# Patient Record
Sex: MALE | Race: WHITE | NOT HISPANIC OR LATINO | Employment: OTHER | RURAL
[De-identification: names, ages, dates, MRNs, and addresses within clinical notes are randomized per-mention and may not be internally consistent; named-entity substitution may affect disease eponyms.]

---

## 2018-05-24 ENCOUNTER — OFFICE VISIT (OUTPATIENT)
Dept: RETAIL CLINIC | Facility: CLINIC | Age: 43
End: 2018-05-24

## 2018-05-24 VITALS
OXYGEN SATURATION: 99 % | RESPIRATION RATE: 18 BRPM | HEIGHT: 72 IN | WEIGHT: 216.6 LBS | TEMPERATURE: 97.7 F | HEART RATE: 97 BPM | BODY MASS INDEX: 29.34 KG/M2

## 2018-05-24 DIAGNOSIS — J02.0 STREP PHARYNGITIS: Primary | ICD-10-CM

## 2018-05-24 PROCEDURE — 99203 OFFICE O/P NEW LOW 30 MIN: CPT | Performed by: NURSE PRACTITIONER

## 2018-05-24 RX ORDER — METHYLPHENIDATE HYDROCHLORIDE 10 MG/1
10 TABLET ORAL 2 TIMES DAILY
COMMUNITY
End: 2020-07-28

## 2018-05-24 RX ORDER — AMOXICILLIN 875 MG/1
875 TABLET, COATED ORAL 2 TIMES DAILY
Qty: 20 TABLET | Refills: 0 | Status: SHIPPED | OUTPATIENT
Start: 2018-05-24 | End: 2018-06-03

## 2018-05-24 NOTE — PROGRESS NOTES
"Subjective   Miguelangel Anderson is a 42 y.o. male.   Chief Complaint   Patient presents with   • Sore Throat     Miguelangel presents with sore throat and swollen/enlarged uvula for 24 hours, he is unsure if he has had a fever says he hasn't checked his temperature and has taken no medications for his symptoms. No annual Flu Vaccine      Sore Throat    This is a new problem. The current episode started yesterday. The problem has been gradually worsening. Maximum temperature: unsure. Associated symptoms include swollen glands and trouble swallowing. Pertinent negatives include no shortness of breath. He has tried NSAIDs (taken last night none this morning) for the symptoms.        The following portions of the patient's history were reviewed and updated as appropriate: allergies, current medications, past family history, past medical history, past social history, past surgical history and problem list.    Review of Systems   Constitutional: Negative for fatigue.   HENT: Positive for sore throat and trouble swallowing.    Respiratory: Negative for shortness of breath.    Cardiovascular: Negative for chest pain.   Endocrine: Negative for cold intolerance.   Skin: Negative for rash.   Hematological: Negative for adenopathy.     Pulse 97   Temp 97.7 °F (36.5 °C) (Temporal Artery )   Resp 18   Ht 182.9 cm (72\")   Wt 98.2 kg (216 lb 9.6 oz)   SpO2 99%   BMI 29.38 kg/m²     Objective     Current Outpatient Prescriptions:   •  methylphenidate (RITALIN) 10 MG tablet, Take 10 mg by mouth 2 (Two) Times a Day., Disp: , Rfl:   •  Tamsulosin HCl (FLOMAX PO), Take  by mouth., Disp: , Rfl:   •  amoxicillin (AMOXIL) 875 MG tablet, Take 1 tablet by mouth 2 (Two) Times a Day for 10 days., Disp: 20 tablet, Rfl: 0  Allergies   Allergen Reactions   • Sulfa Antibiotics Swelling       Physical Exam   Constitutional: He is oriented to person, place, and time. He appears well-developed and well-nourished.   HENT:   Head: Normocephalic and " atraumatic.   Right Ear: Tympanic membrane and external ear normal.   Left Ear: Tympanic membrane and external ear normal.   Nose: Mucosal edema present. Right sinus exhibits no maxillary sinus tenderness and no frontal sinus tenderness. Left sinus exhibits no maxillary sinus tenderness and no frontal sinus tenderness.   Mouth/Throat: Uvula is midline. Uvula swelling present. Posterior oropharyngeal erythema present. No oropharyngeal exudate. Tonsils are 1+ on the right. Tonsils are 2+ on the left. Tonsillar exudate.   Eyes: Conjunctivae and EOM are normal. Pupils are equal, round, and reactive to light.   Neck: Normal range of motion. Neck supple.   Cardiovascular: Normal rate, regular rhythm and normal heart sounds.    Pulmonary/Chest: Effort normal and breath sounds normal. No respiratory distress.   Abdominal: Soft. Bowel sounds are normal.   Musculoskeletal: Normal range of motion.   Lymphadenopathy:     He has cervical adenopathy.   Neurological: He is alert and oriented to person, place, and time.   Skin: Skin is warm and dry. No rash noted.   Psychiatric: He has a normal mood and affect. His behavior is normal. Judgment and thought content normal.   Nursing note and vitals reviewed.      Assessment/Plan   Miguelangel was seen today for sore throat.    Diagnoses and all orders for this visit:    Strep pharyngitis  -     amoxicillin (AMOXIL) 875 MG tablet; Take 1 tablet by mouth 2 (Two) Times a Day for 10 days.

## 2018-05-24 NOTE — PATIENT INSTRUCTIONS
Strep Throat  Strep throat is a bacterial infection of the throat. Your health care provider may call the infection tonsillitis or pharyngitis, depending on whether there is swelling in the tonsils or at the back of the throat. Strep throat is most common during the cold months of the year in children who are 5-15 years of age, but it can happen during any season in people of any age. This infection is spread from person to person (contagious) through coughing, sneezing, or close contact.  What are the causes?  Strep throat is caused by the bacteria called Streptococcus pyogenes.  What increases the risk?  This condition is more likely to develop in:  · People who spend time in crowded places where the infection can spread easily.  · People who have close contact with someone who has strep throat.  What are the signs or symptoms?  Symptoms of this condition include:  · Fever or chills.  · Redness, swelling, or pain in the tonsils or throat.  · Pain or difficulty when swallowing.  · White or yellow spots on the tonsils or throat.  · Swollen, tender glands in the neck or under the jaw.  · Red rash all over the body (rare).  How is this diagnosed?  This condition is diagnosed by performing a rapid strep test (you are being treated based upon your physical exam and symptoms today) or by taking a swab of your throat (throat culture test). Results from a rapid strep test are usually ready in a few minutes, but throat culture test results are available after one or two days.  How is this treated?  This condition is treated with antibiotic medicine.  Follow these instructions at home:  Medicines   · Take over-the-counter and prescription medicines only as told by your health care provider.  · Take your antibiotic as told by your health care provider. Do not stop taking the antibiotic even if you start to feel better.  · Have family members who also have a sore throat or fever tested for strep throat. They may need  antibiotics if they have the strep infection.  Eating and drinking   · Do not share food, drinking cups, or personal items that could cause the infection to spread to other people.  · If swallowing is difficult, try eating soft foods until your sore throat feels better.  · Drink enough fluid to keep your urine clear or pale yellow.  General instructions   · Gargle with a salt-water mixture 3-4 times per day or as needed. To make a salt-water mixture, completely dissolve ½-1 tsp of salt in 1 cup of warm water.  · Make sure that all household members wash their hands well.  · Get plenty of rest.  · Stay home from school or work until you have been taking antibiotics for 24 hours.  · Keep all follow-up visits as told by your health care provider. This is important.  Contact a health care provider if:  · The glands in your neck continue to get bigger.  · You develop a rash, cough, or earache.  · You cough up a thick liquid that is green, yellow-brown, or bloody.  · You have pain or discomfort that does not get better with medicine.  · Your problems seem to be getting worse rather than better.  · You have a fever.  Get help right away if:  · You have new symptoms, such as vomiting, severe headache, stiff or painful neck, chest pain, or shortness of breath.  · You have severe throat pain, drooling, or changes in your voice.  · You have swelling of the neck, or the skin on the neck becomes red and tender.  · You have signs of dehydration, such as fatigue, dry mouth, and decreased urination.  · You become increasingly sleepy, or you cannot wake up completely.  · Your joints become red or painful.  This information is not intended to replace advice given to you by your health care provider. Make sure you discuss any questions you have with your health care provider.  Document Released: 12/15/2001 Document Revised: 08/16/2017 Document Reviewed: 04/11/2016  [employer] Interactive Patient Education © 2017 [employer]

## 2018-10-22 ENCOUNTER — OFFICE VISIT (OUTPATIENT)
Dept: RETAIL CLINIC | Facility: CLINIC | Age: 43
End: 2018-10-22

## 2018-10-22 VITALS
SYSTOLIC BLOOD PRESSURE: 130 MMHG | OXYGEN SATURATION: 98 % | DIASTOLIC BLOOD PRESSURE: 84 MMHG | BODY MASS INDEX: 29.73 KG/M2 | WEIGHT: 219.2 LBS | RESPIRATION RATE: 18 BRPM | HEART RATE: 65 BPM | TEMPERATURE: 98 F

## 2018-10-22 DIAGNOSIS — J01.00 ACUTE MAXILLARY SINUSITIS, RECURRENCE NOT SPECIFIED: Primary | ICD-10-CM

## 2018-10-22 DIAGNOSIS — K12.0 APHTHOUS ULCER: ICD-10-CM

## 2018-10-22 PROCEDURE — 99213 OFFICE O/P EST LOW 20 MIN: CPT | Performed by: NURSE PRACTITIONER

## 2018-10-22 RX ORDER — DIPHENHYDRAMINE, LIDOCAINE, NYSTATIN
5 KIT ORAL 4 TIMES DAILY
Qty: 100 ML | Refills: 0 | Status: SHIPPED | OUTPATIENT
Start: 2018-10-22 | End: 2018-10-27

## 2018-10-22 RX ORDER — DOXYCYCLINE 100 MG/1
100 CAPSULE ORAL 2 TIMES DAILY
Qty: 20 CAPSULE | Refills: 0 | Status: SHIPPED | OUTPATIENT
Start: 2018-10-22 | End: 2018-11-01

## 2018-10-22 NOTE — PATIENT INSTRUCTIONS
Canker Sores  Canker sores are small, painful sores that develop inside your mouth. They may also be called aphthous ulcers. You can get canker sores on the inside of your lips or cheeks, on your tongue, or anywhere inside your mouth. You can have just one canker sore or several of them. Canker sores cannot be passed from one person to another (noncontagious). These sores are different than the sores that you may get on the outside of your lips (cold sores or fever blisters).  Canker sores usually start as painful red bumps. Then they turn into small white, yellow, or gray ulcers that have red borders. The ulcers may be quite painful. The pain may be worse when you eat or drink.  What are the causes?  The cause of this condition is not known.  What increases the risk?  This condition is more likely to develop in:  · Women.  · People in their teens or 20s.  · Women who are having their menstrual period.  · People who are under a lot of emotional stress.  · People who do not get enough iron or B vitamins.  · People who have poor oral hygiene.  · People who have an injury inside the mouth. This can happen after having dental work or from chewing something hard.    What are the signs or symptoms?  Along with the canker sore, symptoms may also include:  · Fever.  · Fatigue.  · Swollen lymph nodes in your neck.    How is this diagnosed?  This condition can be diagnosed based on your symptoms. Your health care provider will also examine your mouth. Your health care provider may also do tests if you get canker sores often or if they are very bad. Tests may include:  · Blood tests to rule out other causes of canker sores.  · Taking swabs from the sore to check for infection.  · Taking a small piece of skin from the sore (biopsy) to test it for cancer.    How is this treated?  Most canker sores clear up without treatment in about 10 days. Home care is usually the only treatment that you will need. Over-the-counter medicines  can relieve discomfort. If you have severe canker sores, your health care provider may prescribe:  · Numbing ointment to relieve pain.  · Vitamins.  · Steroid medicines. These may be given as:  ? Oral pills.  ? Mouth rinses.  ? Gels.  · Antibiotic mouth rinse.    Follow these instructions at home:  · Apply, take, or use medicines only as directed by your health care provider. These include vitamins.  · If you were prescribed an antibiotic mouth rinse, finish all of it even if you start to feel better.  · Until the sores are healed:  ? Do not drink coffee or citrus juices.  ? Do not eat spicy or salty foods.  · Use a mild, over-the-counter mouth rinse as directed by your health care provider.  · Practice good oral hygiene.  ? Floss your teeth every day.  ? Brush your teeth with a soft brush twice each day.  Contact a health care provider if:  · Your symptoms do not get better after two weeks.  · You also have a fever or swollen glands.  · You get canker sores often.  · You have a canker sore that is getting larger.  · You cannot eat or drink due to your canker sores.  This information is not intended to replace advice given to you by your health care provider. Make sure you discuss any questions you have with your health care provider.  Document Released: 04/13/2012 Document Revised: 05/25/2017 Document Reviewed: 11/18/2015  [employer] Interactive Patient Education © 2018 [employer]    Sinusitis, Adult  Sinusitis is soreness and inflammation of your sinuses. Sinuses are hollow spaces in the bones around your face. They are located:  · Around your eyes.  · In the middle of your forehead.  · Behind your nose.  · In your cheekbones.    Your sinuses and nasal passages are lined with a stringy fluid (mucus). Mucus normally drains out of your sinuses. When your nasal tissues get inflamed or swollen, the mucus can get trapped or blocked so air cannot flow through your sinuses. This lets bacteria, viruses, and funguses  grow, and that leads to infection.  Follow these instructions at home:  Medicines  · Take, use, or apply over-the-counter and prescription medicines only as told by your doctor. These may include nasal sprays.  · If you were prescribed an antibiotic medicine, take it as told by your doctor. Do not stop taking the antibiotic even if you start to feel better.  Hydrate and Humidify  · Drink enough water to keep your pee (urine) clear or pale yellow.  · Use a cool mist humidifier to keep the humidity level in your home above 50%.  · Breathe in steam for 10-15 minutes, 3-4 times a day or as told by your doctor. You can do this in the bathroom while a hot shower is running.  · Try not to spend time in cool or dry air.  Rest  · Rest as much as possible.  · Sleep with your head raised (elevated).  · Make sure to get enough sleep each night.  General instructions  · Put a warm, moist washcloth on your face 3-4 times a day or as told by your doctor. This will help with discomfort.  · Wash your hands often with soap and water. If there is no soap and water, use hand .  · Do not smoke. Avoid being around people who are smoking (secondhand smoke).  · Keep all follow-up visits as told by your doctor. This is important.  Contact a doctor if:  · You have a fever.  · Your symptoms get worse.  · Your symptoms do not get better within 10 days.  Get help right away if:  · You have a very bad headache.  · You cannot stop throwing up (vomiting).  · You have pain or swelling around your face or eyes.  · You have trouble seeing.  · You feel confused.  · Your neck is stiff.  · You have trouble breathing.  This information is not intended to replace advice given to you by your health care provider. Make sure you discuss any questions you have with your health care provider.  Document Released: 06/05/2009 Document Revised: 08/13/2017 Document Reviewed: 10/12/2016  [employer] Interactive Patient Education © 2018 [employer]

## 2018-10-22 NOTE — PROGRESS NOTES
Subjective   Miguelangel Anderson is a 43 y.o. male.   Chief Complaint   Patient presents with   • URI      URI    This is a new problem. The current episode started in the past 7 days. The problem has been gradually worsening. There has been no fever. Associated symptoms include congestion, headaches, rhinorrhea and sinus pain. Associated symptoms comments: Mouth sores. He has tried antihistamine for the symptoms. The treatment provided no relief.        The following portions of the patient's history were reviewed and updated as appropriate: allergies, current medications, past family history, past medical history, past social history, past surgical history and problem list.    Review of Systems   HENT: Positive for congestion, mouth sores, postnasal drip, rhinorrhea, sinus pain and sinus pressure.    Neurological: Positive for headaches.   All other systems reviewed and are negative.      Objective   Allergies   Allergen Reactions   • Sulfa Antibiotics Swelling       Physical Exam   Constitutional: He is oriented to person, place, and time. He appears well-developed and well-nourished.   HENT:   Right Ear: Tympanic membrane normal.   Left Ear: Tympanic membrane normal.   Nose: Mucosal edema present. Right sinus exhibits maxillary sinus tenderness. Left sinus exhibits maxillary sinus tenderness.   Mouth/Throat: Posterior oropharyngeal erythema present. No oropharyngeal exudate.   Copious mucoid PND   Eyes: Pupils are equal, round, and reactive to light.   Neck: Neck supple.   Cardiovascular: Normal rate and regular rhythm.    Pulmonary/Chest: Effort normal and breath sounds normal.   Abdominal: There is no rebound.   Lymphadenopathy:     He has no cervical adenopathy.   Neurological: He is alert and oriented to person, place, and time.   Skin: Skin is warm and dry. No rash noted.   Psychiatric: He has a normal mood and affect.   Vitals reviewed.      Assessment/Plan   Miguelangel was seen today for uri.    Diagnoses and all  orders for this visit:    Acute maxillary sinusitis, recurrence not specified    Aphthous ulcer    Other orders  -     doxycycline (MONODOX) 100 MG capsule; Take 1 capsule by mouth 2 (Two) Times a Day for 10 days.  -     nystatin susp + lidocaine viscous (MAGIC MOUTHWASH) oral suspension; Swish and swallow 5 mL 4 (Four) Times a Day for 5 days. 1:1                 This document has been electronically signed by BLAINE Duncan October 22, 2018 12:51 PM

## 2019-01-29 ENCOUNTER — TRANSCRIBE ORDERS (OUTPATIENT)
Dept: ADMINISTRATIVE | Facility: HOSPITAL | Age: 44
End: 2019-01-29

## 2020-07-28 ENCOUNTER — OFFICE VISIT (OUTPATIENT)
Dept: SURGERY | Facility: CLINIC | Age: 45
End: 2020-07-28

## 2020-07-28 VITALS
BODY MASS INDEX: 31.02 KG/M2 | SYSTOLIC BLOOD PRESSURE: 123 MMHG | HEART RATE: 59 BPM | WEIGHT: 229 LBS | DIASTOLIC BLOOD PRESSURE: 86 MMHG | HEIGHT: 72 IN

## 2020-07-28 DIAGNOSIS — K64.5 THROMBOSED EXTERNAL HEMORRHOID: Primary | ICD-10-CM

## 2020-07-28 PROCEDURE — 46083 INC THROMBOSED HROID XTRNL: CPT | Performed by: SURGERY

## 2020-07-28 NOTE — PROGRESS NOTES
Subjective   Miguelangel Anderson is a 45 y.o. male is being seen for consultation today at the request of Mannie Horvath DO    Miguelangel Anderson is a 45 y.o. male Presenting with perianal pain and swelling.  On examination he has a thrombosed external hemorrhoid in the left lateral column.  No diarrhea or blood per rectum.  Pain began 2 to 3 days ago and has worsened over time.  No discharge noted.  No family history of colon cancer.      Past Medical History:   Diagnosis Date   • ADD (attention deficit disorder)    • Environmental allergies    • Strep pharyngitis        Family History   Problem Relation Age of Onset   • Heart disease Father    • Diabetes Father    • Stroke Father    • Obesity Mother    • Obesity Sister    • Cancer Brother    • Diabetes Brother    • Heart disease Brother    • Obesity Brother    • Cancer Maternal Grandmother        Social History     Socioeconomic History   • Marital status:      Spouse name: Not on file   • Number of children: Not on file   • Years of education: Not on file   • Highest education level: Not on file   Occupational History   • Occupation: Business Owner   Tobacco Use   • Smoking status: Never Smoker   • Smokeless tobacco: Never Used   Substance and Sexual Activity   • Alcohol use: No   • Drug use: No       Past Surgical History:   Procedure Laterality Date   • EAR TUBES Bilateral     at 9 months, 2/4/8 years had 4 sets   • FRACTURE SURGERY Left 1993    upper arm with plate & screws   • KNEE ARTHROSCOPY Right 1990   • NASAL SEPTUM SURGERY  2003   • SHOULDER SURGERY Left 1987    fracture with setting       Review of Systems   Constitutional: Negative for activity change, appetite change, chills and fever.   HENT: Negative for sore throat and trouble swallowing.    Eyes: Negative for visual disturbance.   Respiratory: Negative for cough and shortness of breath.    Cardiovascular: Negative for chest pain and palpitations.   Gastrointestinal: Negative for abdominal  "distention, abdominal pain, blood in stool, constipation, diarrhea, nausea and vomiting.   Endocrine: Negative for cold intolerance and heat intolerance.   Genitourinary: Negative for dysuria.   Musculoskeletal: Negative for joint swelling.   Skin: Negative for color change, rash and wound.   Allergic/Immunologic: Negative for immunocompromised state.   Neurological: Negative for dizziness, seizures, weakness and headaches.   Hematological: Negative for adenopathy. Does not bruise/bleed easily.   Psychiatric/Behavioral: Negative for agitation and confusion.         /86   Pulse 59   Ht 182.9 cm (72.01\")   Wt 104 kg (229 lb)   BMI 31.05 kg/m²   Objective   Physical Exam   Constitutional: He is oriented to person, place, and time. He appears well-developed.   HENT:   Head: Normocephalic and atraumatic.   Mouth/Throat: Mucous membranes are normal.   Eyes: Pupils are equal, round, and reactive to light. Conjunctivae are normal.   Neck: Neck supple. No JVD present. No tracheal deviation present. No thyromegaly present.   Cardiovascular: Normal rate and regular rhythm. Exam reveals no gallop and no friction rub.   No murmur heard.  Pulmonary/Chest: Effort normal and breath sounds normal.   Abdominal: Soft. He exhibits no distension. There is no splenomegaly or hepatomegaly. There is no tenderness. No hernia.   Genitourinary: Rectal exam shows external hemorrhoid.         Genitourinary Comments: Left lateral thrombosed external hemorrhoid   Musculoskeletal: Normal range of motion. He exhibits no deformity.   Neurological: He is alert and oriented to person, place, and time.   Skin: Skin is warm and dry.   Psychiatric: He has a normal mood and affect.       Evacuation of thrombosed external hemorrhoid    Patient perianal tissues prepped and draped in usual sterile fashion.  After injection of local anesthetic a radially oriented incision overlying the hemorrhoid was made and dissection was carried down to the " thrombus.  Thrombus was removed with direct pressure and there is no evidence of active bleeding.  The wound was left open and dressed with an ABD pad.  Patient tolerated the procedure well.      Assessment   Miguelangel was seen today for hemorrhoids.    Diagnoses and all orders for this visit:    Thrombosed external hemorrhoid      Miguelangel Anderson is a 45 y.o. male with thrombosed external hemorrhoid in the left lateral location.  Evacuation of clot performed in the office today and he will follow-up in 2 weeks.    Patient's Body mass index is 31.05 kg/m². BMI is above normal parameters. Recommendations include: educational material.

## 2021-03-10 ENCOUNTER — HOSPITAL ENCOUNTER (OUTPATIENT)
Dept: GENERAL RADIOLOGY | Facility: HOSPITAL | Age: 46
Discharge: HOME | End: 2021-03-10
Admitting: FAMILY MEDICINE

## 2021-03-10 ENCOUNTER — TRANSCRIBE ORDERS (OUTPATIENT)
Dept: ADMINISTRATIVE | Facility: HOSPITAL | Age: 46
End: 2021-03-10

## 2021-03-10 DIAGNOSIS — M25.512 LEFT SHOULDER PAIN, UNSPECIFIED CHRONICITY: ICD-10-CM

## 2021-03-10 DIAGNOSIS — M25.512 LEFT SHOULDER PAIN, UNSPECIFIED CHRONICITY: Primary | ICD-10-CM

## 2021-03-10 PROCEDURE — 73030 X-RAY EXAM OF SHOULDER: CPT

## 2021-03-10 PROCEDURE — 73030 X-RAY EXAM OF SHOULDER: CPT | Performed by: RADIOLOGY

## 2021-08-24 ENCOUNTER — HOSPITAL ENCOUNTER (OUTPATIENT)
Dept: HOSPITAL 79 - RAD | Age: 46
End: 2021-08-24
Attending: FAMILY MEDICINE
Payer: COMMERCIAL

## 2021-08-24 DIAGNOSIS — L55.9: ICD-10-CM

## 2021-08-24 DIAGNOSIS — S22.000A: ICD-10-CM

## 2021-08-24 DIAGNOSIS — R07.81: ICD-10-CM

## 2021-08-24 DIAGNOSIS — S22.32XA: ICD-10-CM

## 2021-08-24 DIAGNOSIS — M54.6: ICD-10-CM

## 2021-08-24 DIAGNOSIS — S32.000A: ICD-10-CM

## 2021-08-24 DIAGNOSIS — M54.5: ICD-10-CM

## 2021-08-24 DIAGNOSIS — Z00.00: Primary | ICD-10-CM
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
## 2021-10-29 ENCOUNTER — OFFICE VISIT (OUTPATIENT)
Dept: UROLOGY | Facility: CLINIC | Age: 46
End: 2021-10-29

## 2021-10-29 VITALS — BODY MASS INDEX: 32.1 KG/M2 | HEIGHT: 72 IN | WEIGHT: 237 LBS

## 2021-10-29 DIAGNOSIS — N50.812 LEFT TESTICULAR PAIN: Primary | ICD-10-CM

## 2021-10-29 DIAGNOSIS — I86.1 LEFT VARICOCELE: ICD-10-CM

## 2021-10-29 DIAGNOSIS — R35.0 FREQUENCY OF MICTURITION: ICD-10-CM

## 2021-10-29 DIAGNOSIS — R10.32 GROIN PAIN, LEFT: ICD-10-CM

## 2021-10-29 LAB
BILIRUB BLD-MCNC: NEGATIVE MG/DL
CLARITY, POC: CLEAR
COLOR UR: YELLOW
EXPIRATION DATE: ABNORMAL
GLUCOSE UR STRIP-MCNC: NEGATIVE MG/DL
KETONES UR QL: ABNORMAL
LEUKOCYTE EST, POC: NEGATIVE
Lab: ABNORMAL
NITRITE UR-MCNC: NEGATIVE MG/ML
PH UR: 5 [PH] (ref 5–8)
PROT UR STRIP-MCNC: ABNORMAL MG/DL
RBC # UR STRIP: NEGATIVE /UL
SP GR UR: 1.03 (ref 1–1.03)
UROBILINOGEN UR QL: NORMAL

## 2021-10-29 PROCEDURE — 81003 URINALYSIS AUTO W/O SCOPE: CPT | Performed by: NURSE PRACTITIONER

## 2021-10-29 PROCEDURE — 99204 OFFICE O/P NEW MOD 45 MIN: CPT | Performed by: NURSE PRACTITIONER

## 2021-10-29 RX ORDER — IBUPROFEN 800 MG/1
800 TABLET ORAL EVERY 8 HOURS PRN
Qty: 30 TABLET | Refills: 1 | Status: SHIPPED | OUTPATIENT
Start: 2021-10-29 | End: 2022-08-08 | Stop reason: SDUPTHER

## 2021-10-29 RX ORDER — ERGOCALCIFEROL 1.25 MG/1
50000 CAPSULE ORAL WEEKLY
COMMUNITY
Start: 2021-10-26

## 2021-10-29 RX ORDER — ONDANSETRON 8 MG/1
TABLET, ORALLY DISINTEGRATING ORAL 3 TIMES DAILY
COMMUNITY
Start: 2021-08-04 | End: 2021-10-29

## 2021-10-29 RX ORDER — ONDANSETRON 4 MG/1
4 TABLET, FILM COATED ORAL EVERY 12 HOURS PRN
Qty: 20 TABLET | Refills: 0 | Status: SHIPPED | OUTPATIENT
Start: 2021-10-29

## 2021-10-29 RX ORDER — DEXAMETHASONE 6 MG/1
TABLET ORAL
COMMUNITY
Start: 2021-09-26 | End: 2021-10-29

## 2021-10-29 RX ORDER — IBUPROFEN 600 MG/1
TABLET ORAL AS NEEDED
COMMUNITY
Start: 2021-08-20 | End: 2021-10-29

## 2021-10-29 RX ORDER — LEVALBUTEROL INHALATION SOLUTION 1.25 MG/3ML
SOLUTION RESPIRATORY (INHALATION) TAKE AS DIRECTED
COMMUNITY
Start: 2021-10-04 | End: 2021-10-29

## 2021-10-29 RX ORDER — LOSARTAN POTASSIUM 25 MG/1
50 TABLET ORAL DAILY
COMMUNITY
Start: 2021-10-26 | End: 2022-03-31 | Stop reason: SDUPTHER

## 2021-10-29 RX ORDER — ROSUVASTATIN CALCIUM 10 MG/1
1 TABLET, COATED ORAL DAILY
COMMUNITY
Start: 2021-09-30 | End: 2021-10-29

## 2021-10-29 RX ORDER — ALBUTEROL SULFATE 90 UG/1
AEROSOL, METERED RESPIRATORY (INHALATION)
COMMUNITY
Start: 2021-09-26 | End: 2021-10-29

## 2021-10-29 RX ORDER — HYDROCODONE BITARTRATE AND ACETAMINOPHEN 7.5; 325 MG/1; MG/1
1 TABLET ORAL 3 TIMES DAILY PRN
COMMUNITY
Start: 2021-08-24 | End: 2021-10-29

## 2021-10-29 RX ORDER — BUDESONIDE 0.5 MG/2ML
INHALANT ORAL
COMMUNITY
Start: 2021-09-26 | End: 2021-10-29

## 2021-10-29 RX ORDER — LEVOCETIRIZINE DIHYDROCHLORIDE 5 MG/1
5 TABLET, FILM COATED ORAL
COMMUNITY
Start: 2021-10-26

## 2021-11-08 PROBLEM — N50.812 LEFT TESTICULAR PAIN: Status: ACTIVE | Noted: 2021-11-08

## 2021-11-08 PROBLEM — R10.32 GROIN PAIN, LEFT: Status: ACTIVE | Noted: 2021-11-08

## 2021-11-10 ENCOUNTER — HOSPITAL ENCOUNTER (OUTPATIENT)
Dept: ULTRASOUND IMAGING | Facility: HOSPITAL | Age: 46
Discharge: HOME | End: 2021-11-10
Admitting: NURSE PRACTITIONER

## 2021-11-10 DIAGNOSIS — R35.0 FREQUENCY OF MICTURITION: ICD-10-CM

## 2021-11-10 DIAGNOSIS — N50.812 LEFT TESTICULAR PAIN: ICD-10-CM

## 2021-11-10 DIAGNOSIS — R10.32 GROIN PAIN, LEFT: ICD-10-CM

## 2021-11-10 DIAGNOSIS — I86.1 LEFT VARICOCELE: ICD-10-CM

## 2021-11-10 PROCEDURE — 76870 US EXAM SCROTUM: CPT | Performed by: RADIOLOGY

## 2021-11-10 PROCEDURE — 76870 US EXAM SCROTUM: CPT

## 2021-11-12 ENCOUNTER — OFFICE VISIT (OUTPATIENT)
Dept: UROLOGY | Facility: CLINIC | Age: 46
End: 2021-11-12

## 2021-11-12 VITALS — WEIGHT: 237 LBS | HEIGHT: 72 IN | BODY MASS INDEX: 32.1 KG/M2

## 2021-11-12 DIAGNOSIS — N43.3 HYDROCELE, RIGHT: ICD-10-CM

## 2021-11-12 DIAGNOSIS — N50.812 LEFT TESTICULAR PAIN: ICD-10-CM

## 2021-11-12 DIAGNOSIS — R10.32 GROIN PAIN, LEFT: ICD-10-CM

## 2021-11-12 DIAGNOSIS — I86.1 BILATERAL VARICOCELES: Primary | ICD-10-CM

## 2021-11-12 PROCEDURE — 99214 OFFICE O/P EST MOD 30 MIN: CPT | Performed by: NURSE PRACTITIONER

## 2021-11-12 NOTE — PROGRESS NOTES
"Chief Complaint  LEFT Testicle Pain/VARICOCELE/HYDROCELE (2 WEEK FOLLOW UP/REVIEW ULTRASOUND)    Subjective          Syed Anderson presents to [facility]  History of Present Illness    MR. SYED ANDERSON is a Very pleasant 46-year-old male patient, with a history of BPH with minor urinary symptoms(URNE FREQUENCY), who  returns to clinic today for evaluation.  This is his 2-week follow-up for left groin pain/left testicle pain, He is here to review his ultrasound results which are unremarkable. His last PSA was 0.4 on 09/21/2021, he denies any family history of prostate disorder.    We both reviewed/discussed his scrotal ultrasound results. The imaging of his testicles show a homogeneous echotexture of both right and left testicle, with no testicular mass noted. However he has small right-sided hydrocele, and an equivocal mild varicoceles bilaterally.     He is in no apparent distress today and reports doing relatively better, with significant improvement in his groin pain. Reports warm baths and showers has been very helpful, and rates his discomfort today at a 1/10. Patient denies any known trauma to groin area.  Denies lifting any heavy objects, he denies any issues with constipation or straining on bowel movements. HE has never had prostatitis, and denies any feeling of sitting on eggshells.  He denies any episodes of gross hematuria.     Besides hesitancy and nocturia at times, he denies having any urinary symptoms of frequency, urgency, dysuria or burning on urination.  He denies gross hematuria, chills or fevers, he denies back pain, abdominal pain, flank pain, he does not have any CVA tenderness.  Denies any penile discharge, or any episodes of gross/microscopic hematuria. His IPSS score is 6     Objective   Vital Signs:   Ht 182.9 cm (72.01\")   Wt 108 kg (237 lb)   BMI 32.14 kg/m²     Physical Exam  Constitutional:       General: He is not in acute distress.     " Appearance: He is well-developed. He is obese.   HENT:      Head: Normocephalic and atraumatic.      Right Ear: External ear normal.      Left Ear: External ear normal.   Eyes:      General:         Right eye: No discharge.         Left eye: No discharge.      Conjunctiva/sclera: Conjunctivae normal.      Pupils: Pupils are equal, round, and reactive to light.   Neck:      Thyroid: No thyromegaly.      Trachea: No tracheal deviation.   Cardiovascular:      Rate and Rhythm: Normal rate and regular rhythm.      Heart sounds: No murmur heard.  No friction rub.   Pulmonary:      Effort: Pulmonary effort is normal. No respiratory distress.      Breath sounds: Normal breath sounds. No stridor.   Abdominal:      General: Bowel sounds are normal. There is distension.      Palpations: Abdomen is soft.      Tenderness: There is abdominal tenderness. There is no guarding.   Genitourinary:     Penis: Normal and uncircumcised. No tenderness or discharge.       Testes: Normal.      Rectum: Normal. Guaiac result negative.   Musculoskeletal:         General: Tenderness present. No deformity. Normal range of motion.      Cervical back: Normal range of motion and neck supple.   Skin:     General: Skin is warm and dry.      Capillary Refill: Capillary refill takes less than 2 seconds.      Coloration: Skin is not pale.   Neurological:      Mental Status: He is alert and oriented to person, place, and time.      Cranial Nerves: No cranial nerve deficit.      Coordination: Coordination normal.   Psychiatric:         Behavior: Behavior normal.         Thought Content: Thought content normal.         Judgment: Judgment normal.        IPSS Questionnaire (AUA-7):  Over the past month…    1)  How often have you had a sensation of not emptying your bladder completely after you finish urinating?  1 - Less than 1 time in 5   2)  How often have you had to urinate again less than two hours after you finished urinating? 1 - Less than 1 time in 5    3)  How often have you found you stopped and started again several times when you urinated?  0 - Not at all   4) How difficult have you found it to postpone urination?  0 - Not at all   5) How often have you had a weak urinary stream?  0 - Not at all   6) How often have you had to push or strain to begin urination?  1 - Less than 1 time in 5   7) How many times did you most typically get up to urinate from the time you went to bed until the time you got up in the morning?  1 - 1 time   Total score:  0-7 mildly symptomatic                                                         4    8-19 moderately symptomatic    20-35 severely symptomatic       Result Review :     UA    Urinalysis 10/29/21   Ketones, UA Trace (A)   Leukocytes, UA Negative   (A) Abnormal value                              Assessment and Plan    Diagnoses and all orders for this visit:    1. Bilateral varicoceles (Primary)    2. Hydrocele, right    3. Groin pain, left    4. Left testicular pain      BPH: WE Discussed the pathophysiology of BPH and obstruction. We discussed the static and dynamic effects of BPH as well as using 5 alpha reductase inhibitors versus alpha blockade.  We discussed the indications for transurethral surgery as well.  And/ or other therapeutic options available including all of the newer techniques.  He has no real symptomatology referable to his prostate other than moderate enlargement.  Rather than proceed with an expensive workup he doesn't need, at this time I am recommending an over-the-counter meds such as saw palmetto if indicated. Patient denies any voiding dysfunction.    Next, WE discussed the pathophysiology of varicocele length.  We discussed the staging system of a grade 1 varicocele which is representative of palpable veins of the pampiniform plexus with Valsalva versus a grade 2 and finally grade 3 with testicular atrophy encompassing the entire left hemiscrotum.      I discussed the fact that these are  usually on the left side we discussed the very rare association with malignancy with an acute process.  We discussed the treatment options and less severe cases.  I discussed the fact that 15% of men have these varicoceles and then 30% of men have infertility associated with it but at this time is nothing would pursue until fertility becomes an issue.  We talked about scrotal elevation. We talked about the surgical intervention which is a ligation of a varicocele versus the radiographic embolization with coils when it becomes severe or pain becomes problematic this is a grade 3 left varicocele with significant atrophy.    Finally, we discussed his right hydrocele-we discussed the presence of a hydrocele and the pathophysiology.  We discussed the in indications for intervention including discomfort pain interference with sexual intercourse.  We discussed various treatment alternatives including aspiration with 50% chance of recurrence and open repair with a 6% chance of recurrence but the need for invasive surgery after      We discussed treatment options for his groin pain  with patient encouraged to continue conservative therapy. This is to include scrotal support, rest is the most important treatment in the case of flank pain. Rest and physical therapy are enough to improve minor pain.  Discussed to monitor HIS daily routine with prevention of groin pain by: At least Drinking 8 glasses of water per day, Limiting your alcohol consumption.  Having a healthy diet containing fruits, vegetables, and a lot of fluids, Practicing safe sex. Avoiding heavy lifting, also maintaining proper hygiene of your body as well as the environment. Avoiding any trauma to groin area.  .  Follow-up in 1 year for BPH with PSA prior, sooner if need be.    Patient is in apparent discomfort at this time, denies any surgical interventions    Follow Up   Return in 52 weeks (on 11/11/2022) for Next scheduled follow up BPH/ VARICOCELE/HYDROCEL  LEFT GROIN PAIN.  Patient was given instructions and counseling regarding his condition or for health maintenance advice. Please see specific information pulled into the AVS if appropriate.

## 2021-11-12 NOTE — PATIENT INSTRUCTIONS
Varicocele    A varicocele is a swelling of veins in the scrotum. The scrotum is the sac that contains the testicles. Varicoceles can occur on either side of the scrotum, but they are more common on the left side. They occur most often in teenage boys and young men.  In most cases, varicoceles are not a serious problem. They are usually small and painless and do not require treatment. Tests may be done to confirm the diagnosis. Treatment may be needed if:  A varicocele is large, causes a lot of pain, or causes pain when exercising.  Varicoceles are found on both sides of the scrotum.  A varicocele causes a decrease in the size of the testicle in a growing adolescent.  The person has fertility problems.  What are the causes?  This condition is the result of valves in the veins not working properly. Valves in the veins help to return blood from the scrotum and testicles to the heart. If these valves do not work well, blood flows backward and backs up into the veins, which causes the veins to swell. This is similar to what happens when varicose veins form in the leg.  What are the signs or symptoms?  Most varicoceles do not cause any symptoms. If symptoms do occur, they may include:  Swelling on one side of the scrotum. The swelling may be more obvious when you are standing up.  A lumpy feeling in the scrotum.  A heavy feeling on one side of the scrotum.  A dull ache in the scrotum, especially after exercise or prolonged standing or sitting.  Slower growth or reduced size of the testicle on the side of the varicocele (in young males).  Problems with fathering a child (fertility). This can occur if the testicle does not grow normally or if the condition causes problems with the sperm, such as a low sperm count or sperm that are not able to reach the egg (poor motility).  How is this diagnosed?  This condition is diagnosed based on:  Your medical history.  A physical exam. Your health care provider may inspect and feel  (palpate) the scrotal area to check for swollen or enlarged veins.  An ultrasound. This may be done to confirm the diagnosis and to help rule out other causes of the swelling.  How is this treated?  Treatment is usually not needed for this condition. If you have any pain, your health care provider may prescribe or recommend medicine to help relieve it. You may need regular exams so your health care provider can monitor the varicocele to ensure that it does not cause problems.  When further treatment is needed, it may involve one of these options:  Varicocelectomy. This is a surgery in which the swollen veins are tied off so that the flow of blood goes to other veins instead.  Embolization. In this procedure, a small, thin tube (catheter) is used to place metal coils or other blocking items in the veins. This cuts off the blood flow to the swollen veins.  Follow these instructions at home:  Take over-the-counter and prescription medicines only as told by your health care provider.  Wear supportive underwear.  Use an athletic supporter when participating in sports activities.  Keep all follow-up visits as told by your health care provider. This is important.  Contact a health care provider if:  Your pain is increasing.  You have redness in the affected area.  Your testicle becomes enlarged, swollen, or painful.  You have swelling that does not decrease when you are lying down.  One of your testicles is smaller than the other.  Get help right away if:  You develop swelling in your legs.  You have difficulty breathing.  Summary  Varicocele is a condition in which the veins in the scrotum are swollen or enlarged.  In most cases, varicoceles do not require treatment.  Treatment may be needed if you have pain, have problems with infertility, or have a smaller testicle associated with the varicocele.  In some cases, the condition may be treated with a procedure to cut off the flow of blood to the swollen veins.  This  information is not intended to replace advice given to you by your health care provider. Make sure you discuss any questions you have with your health care provider.  Document Revised: 03/06/2020 Document Reviewed: 03/06/2020  [employer] Patient Education © 2021 [employer]  Scrotal Swelling  Scrotal swelling refers to a condition in which the sac of skin that contains the testes (scrotum) is enlarged or swollen. Many things can cause the scrotum to enlarge or swell, including:  Fluid around the testicle (hydrocele).  A weakened area in the muscles around the groin (hernia).  An enlarged vein around the testicle (varicocele).  An injury.  An infection.  Certain medical treatments.  Certain medical conditions, such as congestive heart failure.  A recent genital surgery or procedure.  A twisting of the spermatic cord that cuts off blood supply (testicular torsion).  Testicular cancer.  Scrotal swelling can happen along with scrotal pain.  Follow these instructions at home:  Until the swelling goes away:  Rest. The best position to rest in is to lie down.  Limit activity.  Put ice on the scrotum:  Put ice in a plastic bag.  Place a towel between your skin and the bag.  Leave the ice on for 20 minutes, 2-3 times a day for 1-2 days.  Place a rolled towel under your testicles for support.  Wear loose-fitting clothing or an athletic support cup for comfort.  Take over-the-counter and prescription medicines only as told by your health care provider.  Perform a monthly self-exam of the scrotum and penis. Feel for changes. Ask your health care provider how to perform a monthly self-exam if you are unsure.  Contact a health care provider if:  You have a sudden pain that is persistent and does not improve.  You have a heavy feeling or notice fluid in the scrotum.  You have pain or burning while urinating.  You have blood in your urine or semen.  You feel a lump around the testicle.  You notice that one testicle is larger than  the other. Keep in mind that a small difference in size is normal.  You have a persistent dull ache or pain in your groin or scrotum.  Get help right away if:  The pain does not go away.  The pain becomes severe.  You have a fever or chills.  You have pain or vomiting that cannot be controlled.  One or both sides of the scrotum are very red and swollen.  There is redness spreading upward from your scrotum to your abdomen or downward from your scrotum to your thighs.  Summary  Scrotal swelling refers to a condition in which the sac of skin that contains the testes (scrotum) is enlarged.  Many things can cause the scrotum to swell, including hydrocele, a hernia, and a varicocele.  Limiting activity and icing the scrotum may help reduce swelling and pain.  Contact your health care provider if you develop scrotal pain that is sudden and persistent, or if you have pain while urinating. Do this also if you feel a lump around the testicle or notice blood in your urine or semen.  Get help right away for uncontrolled pain or vomiting, for very red and swollen scrotum, or for fever or chills.  This information is not intended to replace advice given to you by your health care provider. Make sure you discuss any questions you have with your health care provider.  Document Revised: 11/30/2018 Document Reviewed: 03/05/2018  [employer] Patient Education © 2021 [employer]  Varicocele    A varicocele is a swelling of veins in the scrotum. The scrotum is the sac that contains the testicles. Varicoceles can occur on either side of the scrotum, but they are more common on the left side. They occur most often in teenage boys and young men.  In most cases, varicoceles are not a serious problem. They are usually small and painless and do not require treatment. Tests may be done to confirm the diagnosis. Treatment may be needed if:  · A varicocele is large, causes a lot of pain, or causes pain when exercising.  · Varicoceles are found on  both sides of the scrotum.  · A varicocele causes a decrease in the size of the testicle in a growing adolescent.  · The person has fertility problems.  What are the causes?  This condition is the result of valves in the veins not working properly. Valves in the veins help to return blood from the scrotum and testicles to the heart. If these valves do not work well, blood flows backward and backs up into the veins, which causes the veins to swell. This is similar to what happens when varicose veins form in the leg.  What are the signs or symptoms?  Most varicoceles do not cause any symptoms. If symptoms do occur, they may include:  · Swelling on one side of the scrotum. The swelling may be more obvious when you are standing up.  · A lumpy feeling in the scrotum.  · A heavy feeling on one side of the scrotum.  · A dull ache in the scrotum, especially after exercise or prolonged standing or sitting.  · Slower growth or reduced size of the testicle on the side of the varicocele (in young males).  · Problems with fathering a child (fertility). This can occur if the testicle does not grow normally or if the condition causes problems with the sperm, such as a low sperm count or sperm that are not able to reach the egg (poor motility).  How is this diagnosed?  This condition is diagnosed based on:  · Your medical history.  · A physical exam. Your health care provider may inspect and feel (palpate) the scrotal area to check for swollen or enlarged veins.  · An ultrasound. This may be done to confirm the diagnosis and to help rule out other causes of the swelling.  How is this treated?  Treatment is usually not needed for this condition. If you have any pain, your health care provider may prescribe or recommend medicine to help relieve it. You may need regular exams so your health care provider can monitor the varicocele to ensure that it does not cause problems.  When further treatment is needed, it may involve one of these  options:  · Varicocelectomy. This is a surgery in which the swollen veins are tied off so that the flow of blood goes to other veins instead.  · Embolization. In this procedure, a small, thin tube (catheter) is used to place metal coils or other blocking items in the veins. This cuts off the blood flow to the swollen veins.  Follow these instructions at home:  · Take over-the-counter and prescription medicines only as told by your health care provider.  · Wear supportive underwear.  · Use an athletic supporter when participating in sports activities.  · Keep all follow-up visits as told by your health care provider. This is important.  Contact a health care provider if:  · Your pain is increasing.  · You have redness in the affected area.  · Your testicle becomes enlarged, swollen, or painful.  · You have swelling that does not decrease when you are lying down.  · One of your testicles is smaller than the other.  Get help right away if:  · You develop swelling in your legs.  · You have difficulty breathing.  Summary  · Varicocele is a condition in which the veins in the scrotum are swollen or enlarged.  · In most cases, varicoceles do not require treatment.  · Treatment may be needed if you have pain, have problems with infertility, or have a smaller testicle associated with the varicocele.  · In some cases, the condition may be treated with a procedure to cut off the flow of blood to the swollen veins.  This information is not intended to replace advice given to you by your health care provider. Make sure you discuss any questions you have with your health care provider.  Document Revised: 03/06/2020 Document Reviewed: 03/06/2020  Elsevier Patient Education © 2021 [employer]    Hydrocele, Adult  A hydrocele is a collection of fluid in the loose pouch of skin that holds the testicles (scrotum). This may happen because:  The amount of fluid produced in the scrotum is not absorbed by the rest of the body.  Fluid  from the abdomen fills the scrotum. Normally, the testicles develop in the abdomen then drop into to the scrotum before birth. The tube that the testicles travel through usually closes after the testicles drop. If the tube does not close, fluid from the abdomen can fill the scrotum. This is not very common in adults.  What are the causes?  A hydrocele may be caused by:  An injury to the scrotum.  An infection.  Decreased blood flow to the scrotum.  Twisting of a testicle (testicular torsion).  A birth defect.  A tumor or cancer of the testicle.  Sometimes, the cause is not known.  What are the signs or symptoms?  A hydrocele feels like a water-filled balloon. It may also feel heavy. Other symptoms include:  Swelling of the scrotum. The swelling may decrease when you lie down. You may also notice more swelling at night than in the morning. This is called a communicating hydrocele, in which the fluid in the scrotum goes back into the abdominal cavity when the position of the scrotum changes.  Swelling of the groin.  Mild discomfort in the scrotum.  Pain. This can develop if the hydrocele was caused by infection or twisting. The larger the hydrocele, the more likely you are to have pain. Swelling may also cause pain.  How is this diagnosed?  This condition may be diagnosed based on a physical exam and your medical history. You may also have tests, including:  Imaging tests, such as ultrasound.  A transillumination test. This test takes place in a dark room; a light is placed on the skin of the scrotum. Clear liquid will not impede the light and the scrotum will be illuminated. This helps a health care provider distinguish a hydrocele from a tumor.  Blood or urine tests.  How is this treated?  Most hydroceles go away on their own. If you have no discomfort or pain, your health care provider may suggest close monitoring of your condition (called watch and wait or watchful waiting) until the condition goes away or  symptoms develop. If treatment is needed, it may include:  Treating an underlying condition. This may include taking an antibiotic medicine to treat an infection.  Having surgery to stop fluid from collecting in the scrotum.  Having surgery to drain the fluid. Surgery may include:  Hydrocelectomy. For this procedure, an incision is made in the scrotum to remove the fluid sac.  Needle aspiration. A needle is used to drain fluid. However, the fluid buildup will come back quickly and may lead to an infection of the scrotum. This treatment is rarely used.  Follow these instructions at home:  Medicines  Take over-the-counter and prescription medicines only as told by your health care provider.  If you were prescribed an antibiotic medicine, take it as told by your health care provider. Do not stop taking the antibiotic even if you start to feel better.  General instructions  Watch the hydrocele for any changes.  Keep all follow-up visits as told by your health care provider. This is important.  Contact a health care provider if:  You notice any changes in the hydrocele.  The swelling in your scrotum or groin gets worse.  The hydrocele becomes red, firm, painful, or tender to the touch.  You have a fever.  Get help right away if you:  Develop a lot of pain or your pain becomes worse.  Have shaking chills.  Have a high fever.  Summary  A hydrocele is a collection of fluid in the loose pouch of skin that holds the testicles (scrotum).  A hydrocele can cause swelling, discomfort, and pain.  In adults, the cause of a hydrocele may not be known. However, it is sometimes caused by an infection or a rotation and twisting of the scrotum.  Treatment may not be needed. Hydroceles often go away on their own. If a hydrocele causes pain, treatment may be given to ease the pain.  This information is not intended to replace advice given to you by your health care provider. Make sure you discuss any questions you have with your health  care provider.  Document Revised: 10/14/2020 Document Reviewed: 10/14/2020  Elsevier Patient Education © 2021 [employer]

## 2022-03-31 ENCOUNTER — OFFICE VISIT (OUTPATIENT)
Dept: FAMILY MEDICINE CLINIC | Facility: CLINIC | Age: 47
End: 2022-03-31

## 2022-03-31 VITALS
TEMPERATURE: 97.1 F | DIASTOLIC BLOOD PRESSURE: 65 MMHG | HEART RATE: 63 BPM | HEIGHT: 72 IN | RESPIRATION RATE: 16 BRPM | BODY MASS INDEX: 31.15 KG/M2 | SYSTOLIC BLOOD PRESSURE: 110 MMHG | WEIGHT: 230 LBS | OXYGEN SATURATION: 97 %

## 2022-03-31 DIAGNOSIS — I10 PRIMARY HYPERTENSION: Primary | Chronic | ICD-10-CM

## 2022-03-31 DIAGNOSIS — Z12.11 COLON CANCER SCREENING: ICD-10-CM

## 2022-03-31 DIAGNOSIS — M25.512 CHRONIC LEFT SHOULDER PAIN: ICD-10-CM

## 2022-03-31 DIAGNOSIS — G89.29 CHRONIC LEFT SHOULDER PAIN: ICD-10-CM

## 2022-03-31 DIAGNOSIS — E78.2 MIXED HYPERLIPIDEMIA: Chronic | ICD-10-CM

## 2022-03-31 DIAGNOSIS — J30.2 SEASONAL ALLERGIC RHINITIS, UNSPECIFIED TRIGGER: ICD-10-CM

## 2022-03-31 PROBLEM — R10.32 GROIN PAIN, LEFT: Status: RESOLVED | Noted: 2021-11-08 | Resolved: 2022-03-31

## 2022-03-31 PROBLEM — K64.5 THROMBOSED EXTERNAL HEMORRHOID: Status: RESOLVED | Noted: 2020-07-28 | Resolved: 2022-03-31

## 2022-03-31 PROBLEM — N50.812 LEFT TESTICULAR PAIN: Status: RESOLVED | Noted: 2021-11-08 | Resolved: 2022-03-31

## 2022-03-31 PROCEDURE — 99204 OFFICE O/P NEW MOD 45 MIN: CPT | Performed by: INTERNAL MEDICINE

## 2022-03-31 RX ORDER — LOSARTAN POTASSIUM 50 MG/1
50 TABLET ORAL DAILY
Qty: 30 TABLET | Refills: 5 | Status: SHIPPED | OUTPATIENT
Start: 2022-03-31

## 2022-03-31 RX ORDER — FLUTICASONE PROPIONATE 50 MCG
2 SPRAY, SUSPENSION (ML) NASAL DAILY
Qty: 18.2 ML | Refills: 11 | Status: SHIPPED | OUTPATIENT
Start: 2022-03-31

## 2022-03-31 RX ORDER — FLUTICASONE PROPIONATE 50 MCG
2 SPRAY, SUSPENSION (ML) NASAL DAILY
COMMUNITY
End: 2022-03-31 | Stop reason: SDUPTHER
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
## 2022-04-01 ENCOUNTER — CLINICAL SUPPORT (OUTPATIENT)
Dept: FAMILY MEDICINE CLINIC | Facility: CLINIC | Age: 47
End: 2022-04-01

## 2022-04-01 DIAGNOSIS — I10 PRIMARY HYPERTENSION: Chronic | ICD-10-CM

## 2022-04-01 DIAGNOSIS — E78.2 MIXED HYPERLIPIDEMIA: Chronic | ICD-10-CM

## 2022-04-01 PROCEDURE — 80061 LIPID PANEL: CPT | Performed by: INTERNAL MEDICINE

## 2022-04-01 PROCEDURE — 36415 COLL VENOUS BLD VENIPUNCTURE: CPT | Performed by: INTERNAL MEDICINE

## 2022-04-01 PROCEDURE — 83036 HEMOGLOBIN GLYCOSYLATED A1C: CPT | Performed by: INTERNAL MEDICINE

## 2022-04-01 PROCEDURE — 80050 GENERAL HEALTH PANEL: CPT | Performed by: INTERNAL MEDICINE

## 2022-04-01 NOTE — PROGRESS NOTES
Venipuncture Blood Specimen Collection  Venipuncture performed in right arm by Rossy Avila MA with good hemostasis. Patient tolerated the procedure well without complications.   04/01/22   Rossy Avila MA

## 2022-04-02 LAB
ALBUMIN SERPL-MCNC: 4.7 G/DL (ref 3.5–5.2)
ALBUMIN/GLOB SERPL: 2 G/DL
ALP SERPL-CCNC: 74 U/L (ref 39–117)
ALT SERPL W P-5'-P-CCNC: 25 U/L (ref 1–41)
ANION GAP SERPL CALCULATED.3IONS-SCNC: 12 MMOL/L (ref 5–15)
AST SERPL-CCNC: 19 U/L (ref 1–40)
BILIRUB SERPL-MCNC: 0.4 MG/DL (ref 0–1.2)
BUN SERPL-MCNC: 18 MG/DL (ref 6–20)
BUN/CREAT SERPL: 18.6 (ref 7–25)
CALCIUM SPEC-SCNC: 9.2 MG/DL (ref 8.6–10.5)
CHLORIDE SERPL-SCNC: 102 MMOL/L (ref 98–107)
CHOLEST SERPL-MCNC: 227 MG/DL (ref 0–200)
CO2 SERPL-SCNC: 26 MMOL/L (ref 22–29)
CREAT SERPL-MCNC: 0.97 MG/DL (ref 0.76–1.27)
DEPRECATED RDW RBC AUTO: 42.2 FL (ref 37–54)
EGFRCR SERPLBLD CKD-EPI 2021: 96.9 ML/MIN/1.73
ERYTHROCYTE [DISTWIDTH] IN BLOOD BY AUTOMATED COUNT: 14.3 % (ref 12.3–15.4)
GLOBULIN UR ELPH-MCNC: 2.3 GM/DL
GLUCOSE SERPL-MCNC: 88 MG/DL (ref 65–99)
HBA1C MFR BLD: 6 % (ref 4.8–5.6)
HCT VFR BLD AUTO: 40.8 % (ref 37.5–51)
HDLC SERPL-MCNC: 60 MG/DL (ref 40–60)
HGB BLD-MCNC: 15.2 G/DL (ref 13–17.7)
LDLC SERPL CALC-MCNC: 151 MG/DL (ref 0–100)
LDLC/HDLC SERPL: 2.48 {RATIO}
MCH RBC QN AUTO: 33.3 PG (ref 26.6–33)
MCHC RBC AUTO-ENTMCNC: 37.3 G/DL (ref 31.5–35.7)
MCV RBC AUTO: 89.5 FL (ref 79–97)
PLATELET # BLD AUTO: 232 10*3/MM3 (ref 140–450)
PMV BLD AUTO: 10.9 FL (ref 6–12)
POTASSIUM SERPL-SCNC: 4.4 MMOL/L (ref 3.5–5.2)
PROT SERPL-MCNC: 7 G/DL (ref 6–8.5)
RBC # BLD AUTO: 4.56 10*6/MM3 (ref 4.14–5.8)
SODIUM SERPL-SCNC: 140 MMOL/L (ref 136–145)
TRIGL SERPL-MCNC: 90 MG/DL (ref 0–150)
TSH SERPL DL<=0.05 MIU/L-ACNC: 2.71 UIU/ML (ref 0.27–4.2)
VLDLC SERPL-MCNC: 16 MG/DL (ref 5–40)
WBC NRBC COR # BLD: 8.27 10*3/MM3 (ref 3.4–10.8)

## 2022-05-02 ENCOUNTER — OFFICE VISIT (OUTPATIENT)
Dept: FAMILY MEDICINE CLINIC | Facility: CLINIC | Age: 47
End: 2022-05-02

## 2022-05-02 VITALS
SYSTOLIC BLOOD PRESSURE: 111 MMHG | OXYGEN SATURATION: 98 % | WEIGHT: 224 LBS | HEART RATE: 59 BPM | BODY MASS INDEX: 30.34 KG/M2 | TEMPERATURE: 97.1 F | DIASTOLIC BLOOD PRESSURE: 67 MMHG | RESPIRATION RATE: 18 BRPM | HEIGHT: 72 IN

## 2022-05-02 DIAGNOSIS — R07.2 PRECORDIAL PAIN: Primary | ICD-10-CM

## 2022-05-02 DIAGNOSIS — E78.2 MIXED HYPERLIPIDEMIA: Chronic | ICD-10-CM

## 2022-05-02 DIAGNOSIS — I10 PRIMARY HYPERTENSION: Chronic | ICD-10-CM

## 2022-05-02 DIAGNOSIS — Z12.11 COLON CANCER SCREENING: ICD-10-CM

## 2022-05-02 DIAGNOSIS — R73.03 PREDIABETES: ICD-10-CM

## 2022-05-02 PROCEDURE — 99214 OFFICE O/P EST MOD 30 MIN: CPT | Performed by: INTERNAL MEDICINE

## 2022-05-02 PROCEDURE — 93000 ELECTROCARDIOGRAM COMPLETE: CPT | Performed by: INTERNAL MEDICINE

## 2022-05-02 RX ORDER — ASPIRIN 81 MG/1
81 TABLET ORAL DAILY
Qty: 30 TABLET | Refills: 5 | Status: SHIPPED | OUTPATIENT
Start: 2022-05-02

## 2022-05-02 NOTE — PROGRESS NOTES
Patient Name: Miguelangel Anderson Today's Date: 2022   Patient MRN / CSN: [id number] / [id number] Date of Encounter: 2022   Patient Age / : 46 y.o. / 1975 Encounter Provider: Thuy Fregoso DO   Referring Physician: No ref. provider found          Miguelangel is a 46 y.o. male who is being seen today for Hypertension      Hypertension  This is a chronic problem. The current episode started more than 1 year ago. The problem is controlled. Associated symptoms include chest pain. Pertinent negatives include no palpitations or shortness of breath. Current antihypertension treatment includes angiotensin blockers. The current treatment provides significant improvement. There are no compliance problems.    Hyperlipidemia  This is a chronic problem. Recent lipid tests were reviewed and are high (). Associated symptoms include chest pain. Pertinent negatives include no shortness of breath. Current antihyperlipidemic treatment includes diet change. The current treatment provides significant (Patient has made healthy diet changes and lost 6 lbs since last labs were done. He is motivated to continue this) improvement of lipids.   Chest Pain   This is a new problem. Episode onset: 2 wks ago. The problem has been resolved (Patient reports 1 episode of left sided chest pain that lasted several hours 2 weeks ago. This resolved on its own without recurrence). The quality of the pain is described as dull (aching). The pain does not radiate. Pertinent negatives include no diaphoresis, irregular heartbeat, nausea, near-syncope, palpitations, shortness of breath or syncope. He has tried rest for the symptoms. The treatment provided significant relief. Risk factors include male gender.   His past medical history is significant for hyperlipidemia.   His family medical history is significant for CAD. Family history comments: Dad had CABG X 4 at age 57. Miguelangel has never had a cardiac workup.       Allergies include:Penicillins  and Sulfa antibiotics  Current Outpatient Medications   Medication Sig Dispense Refill   • fluticasone (FLONASE) 50 MCG/ACT nasal spray 2 sprays into the nostril(s) as directed by provider Daily. 18.2 mL 11   • ibuprofen (ADVIL,MOTRIN) 800 MG tablet Take 1 tablet by mouth Every 8 (Eight) Hours As Needed for Moderate Pain . 30 tablet 1   • levocetirizine (XYZAL) 5 MG tablet Take 5 mg by mouth every night at bedtime.     • losartan (COZAAR) 50 MG tablet Take 1 tablet by mouth Daily. for blood pressure. 30 tablet 5   • ondansetron (Zofran) 4 MG tablet Take 1 tablet by mouth Every 12 (Twelve) Hours As Needed for Nausea. 20 tablet 0   • vitamin D (ERGOCALCIFEROL) 1.25 MG (73833 UT) capsule capsule Take 50,000 Units by mouth 1 (One) Time Per Week.     • aspirin (aspirin) 81 MG EC tablet Take 1 tablet by mouth Daily. 30 tablet 5     No current facility-administered medications for this visit.     Past Medical History:   Diagnosis Date   • ADD (attention deficit disorder)    • Environmental allergies    • Strep pharyngitis      Family History   Problem Relation Age of Onset   • Obesity Mother    • Heart disease Father 57        CABG X 4   • Diabetes Father    • Stroke Father 53   • Hypertension Father    • Obesity Sister    • Cancer Maternal Grandmother      Past Surgical History:   Procedure Laterality Date   • EAR TUBES Bilateral     at 9 months, 2/4/8 years had 4 sets   • FRACTURE SURGERY Left 1993    upper arm with plate & screws   • KNEE ARTHROSCOPY Right 1990   • NASAL SEPTUM SURGERY  2003   • SHOULDER SURGERY Left 1987    fracture with setting   • VASECTOMY       Social History     Substance and Sexual Activity   Alcohol Use No     Social History     Tobacco Use   Smoking Status Never Smoker   Smokeless Tobacco Never Used     Social History     Substance and Sexual Activity   Drug Use No     Review of Systems   Constitutional: Negative for diaphoresis.   Respiratory: Negative for shortness of breath.   "  Cardiovascular: Positive for chest pain. Negative for palpitations, leg swelling, syncope and near-syncope.        No chest pain at this time. Patient reports an episode of chest pain 2 weeks ago.   Gastrointestinal: Negative for nausea.        Patient denies any recent heartburn.   Neurological: Negative for syncope.        Depression Assessment Review:  PHQ-9 Total Score: 0  Vital Signs & Measurements Taken This Encounter  /67 (BP Location: Left arm, Patient Position: Sitting, Cuff Size: Large Adult)   Pulse 59   Temp 97.1 °F (36.2 °C) (Temporal)   Resp 18   Ht 182.9 cm (72.01\")   Wt 102 kg (224 lb)   SpO2 98%   BMI 30.37 kg/m²    SpO2 Percentage    05/02/22 0842   SpO2: 98%        BMI is >= 30 and <= 34.9 (Class 1 obesity). The following options were offered after discussion: nutrition counseling/recommendations      Physical Exam  Vitals reviewed.   Constitutional:       General: He is not in acute distress.  HENT:      Head: Normocephalic and atraumatic.   Eyes:      General: No scleral icterus.     Extraocular Movements: Extraocular movements intact.      Conjunctiva/sclera: Conjunctivae normal.      Pupils: Pupils are equal, round, and reactive to light.   Cardiovascular:      Rate and Rhythm: Regular rhythm. Bradycardia present.   Pulmonary:      Effort: Pulmonary effort is normal. No respiratory distress.      Breath sounds: Normal breath sounds. No wheezing or rhonchi.   Musculoskeletal:         General: No swelling.   Skin:     General: Skin is warm and dry.      Coloration: Skin is not jaundiced.   Neurological:      Mental Status: He is alert.   Psychiatric:         Mood and Affect: Mood normal.         Behavior: Behavior normal.              Assessment & Plan  Patient Active Problem List   Diagnosis   • Primary hypertension   • Seasonal allergic rhinitis   • Mixed hyperlipidemia   • Left shoulder pain       ICD-10-CM ICD-9-CM   1. Precordial pain  R07.2 786.51   2. Primary hypertension  " I10 401.9   3. Mixed hyperlipidemia  E78.2 272.2   4. Colon cancer screening  Z12.11 V76.51   5. Prediabetes  R73.03 790.29     Orders Placed This Encounter   Procedures   • Cologuard - Stool, Per Rectum     Standing Status:   Future     Standing Expiration Date:   5/2/2023     Order Specific Question:   Release to patient     Answer:   Immediate   • Comprehensive Metabolic Panel     Standing Status:   Future     Standing Expiration Date:   5/2/2023     Order Specific Question:   Release to patient     Answer:   Immediate   • Lipid Panel     Standing Status:   Future     Standing Expiration Date:   5/2/2023     Order Specific Question:   Release to patient     Answer:   Immediate   • Hemoglobin A1c     Standing Status:   Future     Standing Expiration Date:   5/2/2023     Order Specific Question:   Release to patient     Answer:   Immediate   • TSH     Standing Status:   Future     Standing Expiration Date:   5/2/2023     Order Specific Question:   Release to patient     Answer:   Immediate   • Stress test with myocardial perfusion     Standing Status:   Future     Standing Expiration Date:   5/2/2023     Order Specific Question:   What stress agent will be used?     Answer:   Exercise with possible pharmacologic     Order Specific Question:   Reason for exam?     Answer:   Chest Pain     Order Specific Question:   Release to patient     Answer:   Immediate   • ECG 12 Lead     Order Specific Question:   Reason for Exam:     Answer:   chest pain     Order Specific Question:   Release to patient     Answer:   Immediate       Meds Ordered During Visit:  New Medications Ordered This Visit   Medications   • aspirin (aspirin) 81 MG EC tablet     Sig: Take 1 tablet by mouth Daily.     Dispense:  30 tablet     Refill:  5       EKG in office today showed sinus bradycardia, rate 42 bpm,  ms,  ms,  ms, no acute st findings. There was no previous EKG available for comparison.  We will check a stress test.  I  have recommended patient start aspirin 81 mg enteric-coated daily pending results of the stress test.  Recent labs were discussed with patient today.  I commended him on his recent weight loss and healthy diet changes.  I encouraged patient to eat a healthy diet focusing on lean meats and vegetables while avoiding processed foods for weight loss and overall health improvement.  Patient prefers Cologuard over colonoscopy.  Cologuard ordered today.  We will plan to repeat labs before his next appointment in 6 months.  I have encouraged patient to monitor his blood pressure closely.  If his systolic blood pressure drops below 100 mmHg, I have recommended that he decrease his losartan dose to half a tablet daily instead of a whole tablet.  He expressed verbal understanding of this.    Return in about 6 months (around 11/2/2022), or if symptoms worsen or fail to improve, for Labs Prior, Annual.          Referring Provider (if known): No ref. provider found      This document has been electronically signed by Thuy Fregoso DO  May 2, 2022 09:29 EDT    Thuy Fregoso DO, FACOI  [address]  [place], [place] [address] [phone] (office)    Part of this note may be an electronic transcription/translation of spoken language to printed text using the Dragon Dictation System.

## 2022-05-31 ENCOUNTER — HOSPITAL ENCOUNTER (OUTPATIENT)
Dept: CARDIOLOGY | Facility: HOSPITAL | Age: 47
Discharge: HOME | End: 2022-05-31

## 2022-05-31 ENCOUNTER — HOSPITAL ENCOUNTER (OUTPATIENT)
Dept: NUCLEAR MEDICINE | Facility: HOSPITAL | Age: 47
Discharge: HOME | End: 2022-05-31

## 2022-05-31 DIAGNOSIS — R07.2 PRECORDIAL PAIN: ICD-10-CM

## 2022-05-31 LAB
BH CV NUCLEAR PRIOR STUDY: 3
BH CV REST NUCLEAR ISOTOPE DOSE: 11.6 MCI
BH CV STRESS BP STAGE 1: NORMAL
BH CV STRESS BP STAGE 2: NORMAL
BH CV STRESS BP STAGE 3: NORMAL
BH CV STRESS DURATION MIN STAGE 1: 3
BH CV STRESS DURATION MIN STAGE 2: 3
BH CV STRESS DURATION MIN STAGE 3: 3
BH CV STRESS DURATION MIN STAGE 4: 0
BH CV STRESS DURATION SEC STAGE 1: 0
BH CV STRESS DURATION SEC STAGE 2: 0
BH CV STRESS DURATION SEC STAGE 3: 0
BH CV STRESS DURATION SEC STAGE 4: 35
BH CV STRESS GRADE STAGE 1: 10
BH CV STRESS GRADE STAGE 2: 12
BH CV STRESS GRADE STAGE 3: 14
BH CV STRESS GRADE STAGE 4: 16
BH CV STRESS HR STAGE 1: 91
BH CV STRESS HR STAGE 2: 115
BH CV STRESS HR STAGE 3: 135
BH CV STRESS HR STAGE 4: 155
BH CV STRESS METS STAGE 1: 5
BH CV STRESS METS STAGE 2: 7.5
BH CV STRESS METS STAGE 3: 10
BH CV STRESS METS STAGE 4: 13.5
BH CV STRESS NUCLEAR ISOTOPE DOSE: 31.1 MCI
BH CV STRESS PROTOCOL 1: NORMAL
BH CV STRESS RECOVERY BP: NORMAL MMHG
BH CV STRESS RECOVERY HR: 92 BPM
BH CV STRESS SPEED STAGE 1: 1.7
BH CV STRESS SPEED STAGE 2: 2.5
BH CV STRESS SPEED STAGE 3: 3.4
BH CV STRESS SPEED STAGE 4: 4.2
BH CV STRESS STAGE 1: 1
BH CV STRESS STAGE 2: 2
BH CV STRESS STAGE 3: 3
BH CV STRESS STAGE 4: 4
LV EF NUC BP: 66 %
MAXIMAL PREDICTED HEART RATE: 173 BPM
PERCENT MAX PREDICTED HR: 89.6 %
STRESS BASELINE BP: NORMAL MMHG
STRESS BASELINE HR: 57 BPM
STRESS PERCENT HR: 105 %
STRESS POST ESTIMATED WORKLOAD: 10.1 METS
STRESS POST EXERCISE DUR MIN: 9 MIN
STRESS POST EXERCISE DUR SEC: 35 SEC
STRESS POST PEAK BP: NORMAL MMHG
STRESS POST PEAK HR: 155 BPM
STRESS TARGET HR: 147 BPM

## 2022-05-31 PROCEDURE — 78452 HT MUSCLE IMAGE SPECT MULT: CPT | Performed by: INTERNAL MEDICINE

## 2022-05-31 PROCEDURE — A9500 TC99M SESTAMIBI: HCPCS | Performed by: INTERNAL MEDICINE

## 2022-05-31 PROCEDURE — 0 TECHNETIUM SESTAMIBI: Performed by: INTERNAL MEDICINE

## 2022-05-31 PROCEDURE — 93017 CV STRESS TEST TRACING ONLY: CPT

## 2022-05-31 PROCEDURE — 93018 CV STRESS TEST I&R ONLY: CPT | Performed by: INTERNAL MEDICINE

## 2022-05-31 PROCEDURE — 78452 HT MUSCLE IMAGE SPECT MULT: CPT

## 2022-05-31 RX ADMIN — TECHNETIUM TC 99M SESTAMIBI 1 DOSE: 1 INJECTION INTRAVENOUS at 08:24

## 2022-05-31 RX ADMIN — TECHNETIUM TC 99M SESTAMIBI 1 DOSE: 1 INJECTION INTRAVENOUS at 10:20

## 2022-08-08 ENCOUNTER — OFFICE VISIT (OUTPATIENT)
Dept: FAMILY MEDICINE CLINIC | Facility: CLINIC | Age: 47
End: 2022-08-08

## 2022-08-08 VITALS
SYSTOLIC BLOOD PRESSURE: 141 MMHG | BODY MASS INDEX: 31.02 KG/M2 | DIASTOLIC BLOOD PRESSURE: 89 MMHG | HEART RATE: 53 BPM | WEIGHT: 228.8 LBS | TEMPERATURE: 98.2 F | OXYGEN SATURATION: 98 %

## 2022-08-08 DIAGNOSIS — Z99.89 OSA ON CPAP: Chronic | ICD-10-CM

## 2022-08-08 DIAGNOSIS — M54.31 SCIATICA OF RIGHT SIDE: Primary | ICD-10-CM

## 2022-08-08 DIAGNOSIS — G47.33 OSA ON CPAP: Chronic | ICD-10-CM

## 2022-08-08 PROCEDURE — 99214 OFFICE O/P EST MOD 30 MIN: CPT | Performed by: INTERNAL MEDICINE

## 2022-08-08 RX ORDER — IBUPROFEN 800 MG/1
800 TABLET ORAL EVERY 8 HOURS PRN
Qty: 90 TABLET | Refills: 3 | Status: SHIPPED | OUTPATIENT
Start: 2022-08-08

## 2022-08-08 RX ORDER — TIZANIDINE 2 MG/1
TABLET ORAL
Qty: 90 TABLET | Refills: 3 | Status: SHIPPED | OUTPATIENT
Start: 2022-08-08

## 2022-08-08 NOTE — PROGRESS NOTES
Patient Name: Miguelangel Anderson Today's Date: 2022   Patient MRN / CSN: [id number] / [id number] Date of Encounter: 2022   Patient Age / : 47 y.o. / 1975 Encounter Provider: Thuy Fregoso DO   Referring Physician: No ref. provider found          Miguelangel is a 47 y.o. male who is being seen today for Foot Pain and Leg Pain      Back Pain  This is a chronic problem. The problem has been gradually worsening since onset. The pain is present in the lumbar spine. The quality of the pain is described as aching, cramping and shooting. The pain radiates to the right foot, right knee and right thigh. The pain is moderate. The symptoms are aggravated by position. Associated symptoms include leg pain, numbness, paresthesias and tingling. Pertinent negatives include no bladder incontinence or bowel incontinence. (Miguelangel reports pain in right hip and down RLE to foot. He reports previous L4 fracture.) He has tried NSAIDs for the symptoms. The treatment provided mild relief.       Allergies include:Penicillins and Sulfa antibiotics  Current Outpatient Medications   Medication Sig Dispense Refill   • fluticasone (FLONASE) 50 MCG/ACT nasal spray 2 sprays into the nostril(s) as directed by provider Daily. 18.2 mL 11   • ibuprofen (ADVIL,MOTRIN) 800 MG tablet Take 1 tablet by mouth Every 8 (Eight) Hours As Needed for Moderate Pain . 90 tablet 3   • levocetirizine (XYZAL) 5 MG tablet Take 5 mg by mouth every night at bedtime.     • ondansetron (Zofran) 4 MG tablet Take 1 tablet by mouth Every 12 (Twelve) Hours As Needed for Nausea. 20 tablet 0   • vitamin D (ERGOCALCIFEROL) 1.25 MG (12926 UT) capsule capsule Take 50,000 Units by mouth 1 (One) Time Per Week.     • aspirin (aspirin) 81 MG EC tablet Take 1 tablet by mouth Daily. 30 tablet 5   • losartan (COZAAR) 50 MG tablet Take 1 tablet by mouth Daily. for blood pressure. 30 tablet 5   • tiZANidine (ZANAFLEX) 2 MG tablet Take 1-2 tabs orally 3 times daily as needed 90 tablet  3     No current facility-administered medications for this visit.     Past Medical History:   Diagnosis Date   • ADD (attention deficit disorder)    • Environmental allergies    • Strep pharyngitis      Family History   Problem Relation Age of Onset   • Obesity Mother    • Heart disease Father 57        CABG X 4   • Diabetes Father    • Stroke Father 53   • Hypertension Father    • Obesity Sister    • Cancer Maternal Grandmother      Past Surgical History:   Procedure Laterality Date   • EAR TUBES Bilateral     at 9 months, 2/4/8 years had 4 sets   • FRACTURE SURGERY Left 1993    upper arm with plate & screws   • KNEE ARTHROSCOPY Right 1990   • NASAL SEPTUM SURGERY  2003   • SHOULDER SURGERY Left 1987    fracture with setting   • VASECTOMY       Social History     Substance and Sexual Activity   Alcohol Use No     Social History     Tobacco Use   Smoking Status Never Smoker   Smokeless Tobacco Never Used     Social History     Substance and Sexual Activity   Drug Use No     Review of Systems   Gastrointestinal: Negative for bowel incontinence.   Genitourinary: Negative for bladder incontinence.   Musculoskeletal: Positive for back pain.   Neurological: Positive for tingling, numbness and paresthesias.   Psychiatric/Behavioral: Positive for sleep disturbance.        Patient reports a history of obstructive sleep apnea and is doing well with CPAP therapy.  He reports wearing this every night with good symptom relief.        Depression Assessment Review:  PHQ-9 Total Score:    Vital Signs & Measurements Taken This Encounter  /89 (BP Location: Left arm, Patient Position: Sitting, Cuff Size: Adult)   Pulse 53   Temp 98.2 °F (36.8 °C) (Temporal)   Wt 104 kg (228 lb 12.8 oz)   SpO2 98%   BMI 31.02 kg/m²    SpO2 Percentage    08/08/22 1105   SpO2: 98%          Physical Exam  Vitals reviewed.   Constitutional:       General: He is not in acute distress.  HENT:      Head: Normocephalic and atraumatic.   Eyes:       General: No scleral icterus.     Extraocular Movements: Extraocular movements intact.      Conjunctiva/sclera: Conjunctivae normal.      Pupils: Pupils are equal, round, and reactive to light.   Cardiovascular:      Rate and Rhythm: Regular rhythm. Bradycardia present.   Pulmonary:      Effort: Pulmonary effort is normal. No respiratory distress.      Breath sounds: Normal breath sounds. No wheezing or rhonchi.   Musculoskeletal:         General: No swelling.      Comments: Right paraspinal spasm with tenderness to palpation in lumbar region.  5/5 muscle strength demonstrated in the lower extremities bilaterally in the flexion, extension, and in foot plantar flexion and dorsiflexion.   Skin:     General: Skin is warm and dry.      Coloration: Skin is not jaundiced.   Neurological:      Mental Status: He is alert.      Comments: +1 patellar reflex on the right.  +2 patellar reflex on the left.  +2 Achilles tendon reflexes bilaterally.   Psychiatric:         Mood and Affect: Mood normal.         Behavior: Behavior normal.              Assessment & Plan  Patient Active Problem List   Diagnosis   • Primary hypertension   • Seasonal allergic rhinitis   • Mixed hyperlipidemia   • Left shoulder pain   • BENJAMIN on CPAP       ICD-10-CM ICD-9-CM   1. Sciatica of right side  M54.31 724.3   2. BENJAMIN on CPAP  G47.33 327.23    Z99.89 V46.8     Orders Placed This Encounter   Procedures   • XR Spine Lumbar 2 or 3 View     Standing Status:   Future     Standing Expiration Date:   8/8/2023     Order Specific Question:   Reason for Exam:     Answer:   Sciatica     Order Specific Question:   Release to patient     Answer:   Immediate   • XR Hip With or Without Pelvis 2 - 3 View Right     Standing Status:   Future     Standing Expiration Date:   8/8/2023     Order Specific Question:   Reason for Exam:     Answer:   pain     Order Specific Question:   Release to patient     Answer:   Immediate   • Ambulatory Referral to Physical Therapy  Evaluate and treat     Referral Priority:   Routine     Referral Type:   Physical Therapy     Referral Reason:   Specialty Services Required     Requested Specialty:   Physical Therapy     Number of Visits Requested:   1       Meds Ordered During Visit:  New Medications Ordered This Visit   Medications   • tiZANidine (ZANAFLEX) 2 MG tablet     Sig: Take 1-2 tabs orally 3 times daily as needed     Dispense:  90 tablet     Refill:  3   • ibuprofen (ADVIL,MOTRIN) 800 MG tablet     Sig: Take 1 tablet by mouth Every 8 (Eight) Hours As Needed for Moderate Pain .     Dispense:  90 tablet     Refill:  3       We will check L-spine in right hip x-rays as above.  I encouraged physical therapy at this time.  Zanaflex and ibuprofen discussed with patient to use as needed for pain.  I encouraged patient to continue his other medicines and CPAP as previously arranged.  Patient reports not getting the results of his Cologuard test which he sent in in May.  We will request these results and notify him of them.  We will plan to follow-up in 2 months for his routine appointment as scheduled or certainly sooner if needed.    Return if symptoms worsen or fail to improve, for Next scheduled follow up. Please obtain cologuard results .          Referring Provider (if known): No ref. provider found      This document has been electronically signed by Thuy Fregoso DO  August 8, 2022 12:03 EDT    Thuy Fregoso DO, FACOI  [address] [place] [address]  [phone] (office)    Part of this note may be an electronic transcription/translation of spoken language to printed text using the Dragon Dictation System.

## 2022-08-09 ENCOUNTER — HOSPITAL ENCOUNTER (OUTPATIENT)
Dept: GENERAL RADIOLOGY | Facility: HOSPITAL | Age: 47
Discharge: HOME | End: 2022-08-09

## 2022-08-09 DIAGNOSIS — M54.31 SCIATICA OF RIGHT SIDE: ICD-10-CM

## 2022-08-09 PROCEDURE — 73502 X-RAY EXAM HIP UNI 2-3 VIEWS: CPT | Performed by: RADIOLOGY

## 2022-08-09 PROCEDURE — 72100 X-RAY EXAM L-S SPINE 2/3 VWS: CPT

## 2022-08-09 PROCEDURE — 73502 X-RAY EXAM HIP UNI 2-3 VIEWS: CPT

## 2022-08-09 PROCEDURE — 72100 X-RAY EXAM L-S SPINE 2/3 VWS: CPT | Performed by: RADIOLOGY

## 2022-08-11 ENCOUNTER — HOSPITAL ENCOUNTER (OUTPATIENT)
Dept: MRI IMAGING | Facility: HOSPITAL | Age: 47
Discharge: HOME | End: 2022-08-11
Admitting: INTERNAL MEDICINE

## 2022-08-11 DIAGNOSIS — S32.010A COMPRESSION FRACTURE OF L1 VERTEBRA, INITIAL ENCOUNTER: ICD-10-CM

## 2022-08-11 DIAGNOSIS — S32.010A COMPRESSION FRACTURE OF L1 VERTEBRA, INITIAL ENCOUNTER: Primary | ICD-10-CM

## 2022-08-11 PROCEDURE — 72148 MRI LUMBAR SPINE W/O DYE: CPT

## 2022-08-11 PROCEDURE — 72148 MRI LUMBAR SPINE W/O DYE: CPT | Performed by: RADIOLOGY

## 2022-08-11 RX ORDER — METHYLPREDNISOLONE 4 MG/1
TABLET ORAL
Qty: 21 EACH | Refills: 0 | Status: SHIPPED | OUTPATIENT
Start: 2022-08-11

## 2022-08-12 DIAGNOSIS — M51.36 LUMBAR DEGENERATIVE DISC DISEASE: Primary | ICD-10-CM

## 2022-08-12 DIAGNOSIS — M51.36 BULGING LUMBAR DISC: ICD-10-CM
